# Patient Record
Sex: FEMALE | Race: WHITE | Employment: UNEMPLOYED | URBAN - METROPOLITAN AREA
[De-identification: names, ages, dates, MRNs, and addresses within clinical notes are randomized per-mention and may not be internally consistent; named-entity substitution may affect disease eponyms.]

---

## 2017-04-16 RX ORDER — CARVEDILOL 25 MG/1
TABLET ORAL
Qty: 180 TAB | Refills: 1 | Status: SHIPPED | OUTPATIENT
Start: 2017-04-16 | End: 2017-10-15 | Stop reason: SDUPTHER

## 2017-07-26 RX ORDER — DIGOXIN 125 MCG
TABLET ORAL
Qty: 90 TAB | Refills: 2 | Status: SHIPPED | OUTPATIENT
Start: 2017-07-26 | End: 2018-08-22

## 2017-10-15 RX ORDER — CARVEDILOL 25 MG/1
TABLET ORAL
Qty: 180 TAB | Refills: 1 | Status: SHIPPED | OUTPATIENT
Start: 2017-10-15

## 2018-08-15 ENCOUNTER — TELEPHONE (OUTPATIENT)
Dept: OBGYN CLINIC | Age: 78
End: 2018-08-15

## 2018-08-15 DIAGNOSIS — Z12.39 SCREENING FOR BREAST CANCER: Primary | ICD-10-CM

## 2018-08-15 NOTE — TELEPHONE ENCOUNTER
Patient calling stating that she has an AE scheduled 8/22/2018 and patient would like to have a mammogram order faxed to [facility]. Patient has not been seen since 11/21/2016 - comes to our office every other year due to medicare. Ok to send order since AE is scheduled ?

## 2018-08-22 ENCOUNTER — OFFICE VISIT (OUTPATIENT)
Dept: OBGYN CLINIC | Age: 78
End: 2018-08-22

## 2018-08-22 VITALS
WEIGHT: 155 LBS | DIASTOLIC BLOOD PRESSURE: 62 MMHG | BODY MASS INDEX: 25.83 KG/M2 | HEIGHT: 65 IN | SYSTOLIC BLOOD PRESSURE: 118 MMHG

## 2018-08-22 DIAGNOSIS — Z01.419 ENCOUNTER FOR GYNECOLOGICAL EXAMINATION (GENERAL) (ROUTINE) WITHOUT ABNORMAL FINDINGS: Primary | ICD-10-CM

## 2018-08-22 NOTE — PROGRESS NOTES
Silvia Mckeon is a ,  65 y.o. female [facility] whose LMP was on  who presents for her annual checkup. She is menopausal and amenorrheic. She is having no significant problems. Hormone Status:    She is not having vasomotor symptoms. The patient is not using HRT. She has not had any vaginal bleeding. She reports no gynecologic symptoms. Sexual history:    She  reports that she does not currently engage in sexual activity. Medical conditions:    Since her last annual GYN exam about 2 years ago, she has had the following changes in her health history: Afib. Surgical history confirmed with patient. has a past surgical history that includes hx cataract removal (Bilateral); hx dilation and curettage (); and hx refractive surgery (Left, ). Pap and Mammogram History:    Her most recent Pap smear wasnormal obtained 2 year(s) ago. The patient had a mammo by an outside source (scanned into computer) in 2017. Breast Cancer History/Substance Abuse:     She does not have a family history of breast cancer. Osteoporosis History:    Family history does not include a first or second degree relative with osteopenia or osteoporosis. A bone density scan was obtained in  and revealed A NORMAL SCAN. She is currently taking calcium and vit D. Past Medical History:   Diagnosis Date    Arrhythmia     Diabetes ([address]     type II    Graves disease     Hypercholesterolemia     Hypertension     Osteopenia      Past Surgical History:   Procedure Laterality Date    HX CATARACT REMOVAL Bilateral     HX DILATION AND CURETTAGE      12wk missed AB    HX REFRACTIVE SURGERY Left      Current Outpatient Prescriptions   Medication Sig Dispense Refill    carvedilol (COREG) 25 mg tablet TAKE 1 TABLET TWICE A DAY WITH MEALS 180 Tab 1    rivaroxaban (XARELTO) 20 mg tab tablet Take  by mouth daily.       levothyroxine (SYNTHROID) 88 mcg tablet Take  by mouth Daily (before breakfast).  insulin glargine (LANTUS) 100 unit/mL injection 65 Units by SubCUTAneous route daily. (Patient taking differently: 50 Units by SubCUTAneous route daily. ) 1 Vial 0    albuterol (PROVENTIL HFA, VENTOLIN HFA, PROAIR HFA) 90 mcg/actuation inhaler Take 2 Puffs by inhalation every four (4) hours as needed for Wheezing.  cetirizine (ZYRTEC) 10 mg tablet Take 10 mg by mouth nightly.  insulin NPH (HUMULIN N) 100 unit/mL (3 mL) inpn 25 Units by SubCUTAneous route nightly.  insulin aspart (NOVOLOG) 100 unit/mL inpn by SubCUTAneous route Before breakfast, lunch, and dinner. 25 units breakfast and lunch  30 units dinner      calcium-cholecalciferol, d3, (CALCIUM 600 + D) 600-125 mg-unit tab Take 1 Tab by mouth daily.  multivitamin (DAILY MULTIPLE) tablet Take 1 Tab by mouth daily.  omeprazole (PRILOSEC) 20 mg capsule Take 20 mg by mouth daily. Allergies: Latex   Social History     Social History    Marital status:      Spouse name: N/A    Number of children: N/A    Years of education: N/A     Occupational History    Not on file. Social History Main Topics    Smoking status: Never Smoker    Smokeless tobacco: Never Used    Alcohol use No    Drug use: No    Sexual activity: Not Currently     Other Topics Concern    Not on file     Social History Narrative     Tobacco History:  reports that she has never smoked. She has never used smokeless tobacco.  Alcohol Abuse:  reports that she does not drink alcohol. Drug Abuse:  reports that she does not use illicit drugs.   Patient Active Problem List   Diagnosis Code    Hyponatremia E87.1    Graves disease E05.00    Hyperlipidemia E78.5    HTN (hypertension) I10    Atrial fibrillation with RVR (HCC) I48.91    Acute bronchitis J20.9    Type 2 diabetes mellitus without complication ([facility] [address] I76.7       Review of Systems - History obtained from the patient  Constitutional: negative for weight loss, fever, night sweats  HEENT: negative for hearing loss, earache, congestion, snoring, sorethroat  CV: negative for chest pain, palpitations, edema  Resp: negative for cough, shortness of breath, wheezing  GI: negative for change in bowel habits, abdominal pain, black or bloody stools  : negative for frequency, dysuria, hematuria, vaginal discharge  MSK: negative for back pain, joint pain, muscle pain  Breast: negative for breast lumps, nipple discharge, galactorrhea  Skin :negative for itching, rash, hives  Neuro: negative for dizziness, headache, confusion, weakness  Psych: negative for anxiety, depression, change in mood  Heme/lymph: negative for bleeding, bruising, pallor    Physical Exam    Visit Vitals    /62    Ht 5' 5\" (1.651 m)    Wt 155 lb (70.3 kg)    BMI 25.79 kg/m2     Constitutional  · Appearance: well-nourished, well developed, alert, in no acute distress    HENT  · Head and Face: appears normal    Neck  · Inspection/Palpation: normal appearance, no masses or tenderness  · Lymph Nodes: no lymphadenopathy present  · Thyroid: gland size normal, nontender, no nodules or masses present on palpation    Chest  · Respiratory Effort: breathing normal  · Auscultation: normal breath sounds    Cardiovascular  · Heart:  · Auscultation: regular rate and rhythm without murmur    Breasts  · Inspection of Breasts: breasts symmetrical, no skin changes, no discharge present, nipple appearance normal, no skin retraction present  · Palpation of Breasts and Axillae: no masses present on palpation, no breast tenderness  · Axillary Lymph Nodes: no lymphadenopathy present    Gastrointestinal  · Abdominal Examination: abdomen non-tender to palpation, normal bowel sounds, no masses present  · Liver and spleen: no hepatomegaly present, spleen not palpable  · Hernias: no hernias identified    Genitourinary  · External Genitalia: normal appearance for age with atrophy, no discharge present, no tenderness present, no inflammatory lesions present, no masses present  · Vagina:atrophic vaginal vault with pale epithelium and flattening of rugae, without central or paravaginal defects, no discharge present, no inflammatory lesions present, no masses present  · Bladder: non-tender to palpation  · Urethra: appears normal  · Cervix: normal   · Uterus: normal size, shape and consistency  · Adnexa: no adnexal tenderness present, no adnexal masses present  · Perineum: perineum within normal limits, no evidence of trauma, no rashes or skin lesions present  · Anus: anus within normal limits, no hemorrhoids present  · Inguinal Lymph Nodes: no lymphadenopathy present    Skin  · General Inspection: no rash, no lesions identified    Neurologic/Psychiatric  · Mental Status:  · Orientation: grossly oriented to person, place and time  · Mood and Affect: mood normal, affect appropriate    . Assessment:  Routine gynecologic examination  Her current medical status is satisfactory with no evidence of significant gynecologic issues.     Plan:  Counseled re: diet, exercise, healthy lifestyle  Return for yearly wellness visits  Rec annual mammogram  Pt advised not necessary to come her for exams unless she has an issue

## 2018-08-28 DIAGNOSIS — Z12.39 SCREENING FOR BREAST CANCER: ICD-10-CM

## 2020-01-06 ENCOUNTER — TELEPHONE (OUTPATIENT)
Dept: OBGYN CLINIC | Age: 80
End: 2020-01-06

## 2020-01-06 DIAGNOSIS — Z12.31 BREAST CANCER SCREENING BY MAMMOGRAM: ICD-10-CM

## 2020-01-06 DIAGNOSIS — Z12.39 SCREENING BREAST EXAMINATION: Primary | ICD-10-CM

## 2020-01-06 NOTE — TELEPHONE ENCOUNTER
Message left at 634am    [de-identified]year old patient last seen in the office on 8/22/18. Patient left a message requesting an order for a mammogram to be faxed to [facility] in [place]. This nurse called the patient back and confirmed information. Order for mammogram placed as per MD order. Order faxed to [facility] with fax confirmation received. Patient has appointment scheduled for 1/17/2020      Patient verbalized understanding.

## 2020-01-22 DIAGNOSIS — Z12.31 BREAST CANCER SCREENING BY MAMMOGRAM: ICD-10-CM
